# Patient Record
Sex: MALE | Race: WHITE | NOT HISPANIC OR LATINO | Employment: FULL TIME | URBAN - METROPOLITAN AREA
[De-identification: names, ages, dates, MRNs, and addresses within clinical notes are randomized per-mention and may not be internally consistent; named-entity substitution may affect disease eponyms.]

---

## 2023-07-31 ENCOUNTER — OFFICE VISIT (OUTPATIENT)
Dept: URGENT CARE | Facility: PHYSICIAN GROUP | Age: 53
End: 2023-07-31
Payer: COMMERCIAL

## 2023-07-31 VITALS
BODY MASS INDEX: 34.82 KG/M2 | SYSTOLIC BLOOD PRESSURE: 130 MMHG | DIASTOLIC BLOOD PRESSURE: 72 MMHG | TEMPERATURE: 98.7 F | WEIGHT: 280 LBS | RESPIRATION RATE: 18 BRPM | OXYGEN SATURATION: 100 % | HEIGHT: 75 IN | HEART RATE: 83 BPM

## 2023-07-31 DIAGNOSIS — L03.113 CELLULITIS OF RIGHT UPPER EXTREMITY: ICD-10-CM

## 2023-07-31 PROCEDURE — 3075F SYST BP GE 130 - 139MM HG: CPT | Performed by: FAMILY MEDICINE

## 2023-07-31 PROCEDURE — 99203 OFFICE O/P NEW LOW 30 MIN: CPT | Performed by: FAMILY MEDICINE

## 2023-07-31 PROCEDURE — 3078F DIAST BP <80 MM HG: CPT | Performed by: FAMILY MEDICINE

## 2023-07-31 RX ORDER — OMEPRAZOLE 20 MG/1
CAPSULE, DELAYED RELEASE ORAL
COMMUNITY
Start: 2023-07-28

## 2023-07-31 RX ORDER — AMOXICILLIN AND CLAVULANATE POTASSIUM 875; 125 MG/1; MG/1
1 TABLET, FILM COATED ORAL 2 TIMES DAILY
Qty: 14 TABLET | Refills: 0 | Status: SHIPPED | OUTPATIENT
Start: 2023-07-31 | End: 2023-08-07

## 2023-07-31 ASSESSMENT — ENCOUNTER SYMPTOMS
VOMITING: 0
EYE DISCHARGE: 0
EYE REDNESS: 0
WEIGHT LOSS: 0
MYALGIAS: 0
NAUSEA: 0

## 2023-07-31 NOTE — PROGRESS NOTES
"Subjective     Adriano Trevino is a 52 y.o. male who presents with Elbow Swelling (X 2 days R elbow swollen, no known injury )            2 days pain redness and warmth to skin of right elbow.  No obvious trauma.  No bites.  No drainage.  No vesicular rash.  No itching.  No fever.  No other aggravating alleviating factors.        Review of Systems   Constitutional:  Negative for malaise/fatigue and weight loss.   Eyes:  Negative for discharge and redness.   Gastrointestinal:  Negative for nausea and vomiting.   Musculoskeletal:  Negative for joint pain and myalgias.   Skin:  Negative for itching and rash.              Objective     /72   Pulse 83   Temp 37.1 °C (98.7 °F) (Temporal)   Resp 18   Ht 1.905 m (6' 3\")   Wt (!) 127 kg (280 lb)   SpO2 100%   BMI 35.00 kg/m²      Physical Exam  Constitutional:       General: He is not in acute distress.     Appearance: He is well-developed.   HENT:      Head: Normocephalic and atraumatic.   Eyes:      Conjunctiva/sclera: Conjunctivae normal.   Cardiovascular:      Rate and Rhythm: Normal rate and regular rhythm.      Heart sounds: Normal heart sounds. No murmur heard.  Pulmonary:      Effort: Pulmonary effort is normal.      Breath sounds: Normal breath sounds. No wheezing.   Skin:     General: Skin is warm and dry.      Findings: No rash.          Neurological:      Mental Status: He is alert.                             Assessment & Plan            1. Cellulitis of right upper extremity  amoxicillin-clavulanate (AUGMENTIN) 875-125 MG Tab        Differential diagnosis, natural history, supportive care, and indications for immediate follow-up were discussed.               "

## 2023-08-07 ENCOUNTER — OFFICE VISIT (OUTPATIENT)
Dept: URGENT CARE | Facility: PHYSICIAN GROUP | Age: 53
End: 2023-08-07
Payer: COMMERCIAL

## 2023-08-07 ENCOUNTER — HOSPITAL ENCOUNTER (OUTPATIENT)
Dept: RADIOLOGY | Facility: MEDICAL CENTER | Age: 53
End: 2023-08-07
Attending: NURSE PRACTITIONER
Payer: COMMERCIAL

## 2023-08-07 VITALS
HEART RATE: 84 BPM | TEMPERATURE: 99 F | OXYGEN SATURATION: 100 % | BODY MASS INDEX: 34.9 KG/M2 | SYSTOLIC BLOOD PRESSURE: 128 MMHG | WEIGHT: 286.6 LBS | RESPIRATION RATE: 16 BRPM | HEIGHT: 76 IN | DIASTOLIC BLOOD PRESSURE: 90 MMHG

## 2023-08-07 DIAGNOSIS — L03.113 CELLULITIS OF RIGHT UPPER EXTREMITY: ICD-10-CM

## 2023-08-07 DIAGNOSIS — M25.421 PAIN AND SWELLING OF ELBOW, RIGHT: ICD-10-CM

## 2023-08-07 DIAGNOSIS — M25.521 PAIN AND SWELLING OF ELBOW, RIGHT: ICD-10-CM

## 2023-08-07 PROBLEM — I87.2 VENOUS STASIS DERMATITIS OF LEFT LOWER EXTREMITY: Status: ACTIVE | Noted: 2019-11-08

## 2023-08-07 PROBLEM — R76.0 ANTI-CARDIOLIPIN ANTIBODY POSITIVE: Status: ACTIVE | Noted: 2019-11-08

## 2023-08-07 PROBLEM — I82.532 CHRONIC DEEP VEIN THROMBOSIS (DVT) OF POPLITEAL VEIN OF LEFT LOWER EXTREMITY (HCC): Status: ACTIVE | Noted: 2019-11-08

## 2023-08-07 PROCEDURE — 99214 OFFICE O/P EST MOD 30 MIN: CPT | Performed by: NURSE PRACTITIONER

## 2023-08-07 PROCEDURE — 3074F SYST BP LT 130 MM HG: CPT | Performed by: NURSE PRACTITIONER

## 2023-08-07 PROCEDURE — 73080 X-RAY EXAM OF ELBOW: CPT | Mod: RT

## 2023-08-07 PROCEDURE — 3080F DIAST BP >= 90 MM HG: CPT | Performed by: NURSE PRACTITIONER

## 2023-08-07 RX ORDER — WARFARIN SODIUM 5 MG/1
7.5 TABLET ORAL DAILY
COMMUNITY
Start: 2023-07-28

## 2023-08-07 RX ORDER — CLINDAMYCIN HYDROCHLORIDE 300 MG/1
300 CAPSULE ORAL 3 TIMES DAILY
Qty: 21 CAPSULE | Refills: 0 | Status: SHIPPED | OUTPATIENT
Start: 2023-08-07 | End: 2023-08-14

## 2023-08-07 ASSESSMENT — ENCOUNTER SYMPTOMS
NEUROLOGICAL NEGATIVE: 1
CONSTITUTIONAL NEGATIVE: 1
FEVER: 0

## 2023-08-07 ASSESSMENT — VISUAL ACUITY: OU: 1

## 2023-08-07 NOTE — PROGRESS NOTES
Subjective:     Adriano Trevino is a 52 y.o. male who presents for Elbow Problem (Right - was seen a week ago and was given antibiotics.)       Arm Pain   There was no injury mechanism.     Patient seen in  7/31/2023. Was having right elbow swelling, redness, warmth, and pain. Dx with cellulitis of the RUE and tx with Augmentin.    Patient returns with persistent symptoms. Still has localized pain, tenderness,   Warmth, and swelling over the elbow. Finished abx as directed. Has been icing and elevating. Denies fever or other symptoms.    Review of Systems   Constitutional: Negative.  Negative for fever.   Musculoskeletal:         Per HPI   Neurological: Negative.    All other systems reviewed and are negative.    Refer to HPI for additional details.    During this visit, appropriate PPE was worn, and hand hygiene was performed.    PMH:  has a past medical history of DVT (deep venous thrombosis) ([facility]) (1.13.2014) and Indigestion.    MEDS:   Current Outpatient Medications:     clindamycin (CLEOCIN) 300 MG Cap, Take 1 Capsule by mouth 3 times a day for 7 days., Disp: 21 Capsule, Rfl: 0    omeprazole (PRILOSEC) 20 MG delayed-release capsule, , Disp: , Rfl:     amoxicillin-clavulanate (AUGMENTIN) 875-125 MG Tab, Take 1 Tablet by mouth 2 times a day for 7 days., Disp: 14 Tablet, Rfl: 0    warfarin (COUMADIN) 7.5 MG TABS, Take 7.5 mg by mouth every day., Disp: , Rfl:     warfarin (COUMADIN) 5 MG Tab, Take 7.5 mg by mouth every day., Disp: , Rfl:     ALLERGIES: No Known Allergies  SURGHX:   Past Surgical History:   Procedure Laterality Date    OTHER      sinus surgery x 2; 2006, 2009    OTHER ABDOMINAL SURGERY      appendectomy ~ 1986     SOCHX:  reports that he has never smoked. His smokeless tobacco use includes chew. He reports current alcohol use. He reports that he does not use drugs.    FH: Per HPI as applicable/pertinent.      Objective:     BP (!) 128/90 (BP Location: Right arm, Patient Position: Sitting, BP  "Cuff Size: Adult long)   Pulse 84   Temp 37.2 °C (99 °F) (Temporal)   Resp 16   Ht 1.93 m (6' 4\")   Wt (!) 130 kg (286 lb 9.6 oz)   SpO2 100%   BMI 34.89 kg/m²     Physical Exam  Nursing note reviewed.   Constitutional:       General: He is not in acute distress.     Appearance: He is well-developed. He is not ill-appearing or toxic-appearing.   Eyes:      General: Vision grossly intact.   Cardiovascular:      Rate and Rhythm: Normal rate.      Pulses: Normal pulses.   Pulmonary:      Effort: Pulmonary effort is normal. No respiratory distress.   Musculoskeletal:         General: No deformity. Normal range of motion.      Right elbow: Swelling (Olecranon, induration, warmth, erythema; no definitive fluctuance) present. No deformity or lacerations. Normal range of motion. No tenderness.   Skin:     General: Skin is warm and dry.      Coloration: Skin is not pale.   Neurological:      Mental Status: He is alert and oriented to person, place, and time.      Motor: No weakness.   Psychiatric:         Behavior: Behavior normal. Behavior is cooperative.     X-ray of right elbow:    Details    Reading Physician Reading Date Result Priority   Benjamin Mott M.D.  [phone] 8/7/2023 Ur[facility]     Narrative & Impression     8/7/2023 10:00 AM     HISTORY/REASON FOR EXAM:  Atraumatic Pain/Swelling/Deformity     TECHNIQUE/EXAM DESCRIPTION AND NUMBER OF VIEWS:  3 views of the RIGHT elbow.     COMPARISON: None     FINDINGS:     Soft tissue swelling about the olecranon.     No acute fracture or dislocation. No elbow joint effusion.     No joint osteoarthritis.     IMPRESSION:     Soft tissue swelling about the olecranon.  No acute osseous abnormality.           Exam Ended: 08/07/23 10:06 AM Last Resulted: 08/07/23 11:02 AM           Radiology report and images reviewed by myself. Concur with findings.      Assessment/Plan:     1. Pain and swelling of elbow, right  - DX-ELBOW-COMPLETE 3+ RIGHT; Future    2. Cellulitis " of right upper extremity  - clindamycin (CLEOCIN) 300 MG Cap; Take 1 Capsule by mouth 3 times a day for 7 days.  Dispense: 21 Capsule; Refill: 0    Possible olecranon bursitis, but no definite fluctuance to indicate attempted aspiration. Patient is on warfarin. Rx as above sent electronically for clindamycin. Continue ice and elevation. Follow up with ortho; referral placed.    Differential diagnosis, natural history, supportive care, over-the-counter symptom management per 's instructions, close monitoring, and indications for immediate follow-up discussed.     All questions answered. Patient agrees with the plan of care.    Discharge summary provided via [employer].

## 2023-09-12 ENCOUNTER — OFFICE VISIT (OUTPATIENT)
Dept: URGENT CARE | Facility: PHYSICIAN GROUP | Age: 53
End: 2023-09-12
Payer: COMMERCIAL

## 2023-09-12 ENCOUNTER — HOSPITAL ENCOUNTER (OUTPATIENT)
Dept: RADIOLOGY | Facility: MEDICAL CENTER | Age: 53
End: 2023-09-12
Payer: COMMERCIAL

## 2023-09-12 VITALS
RESPIRATION RATE: 18 BRPM | OXYGEN SATURATION: 95 % | TEMPERATURE: 98.4 F | SYSTOLIC BLOOD PRESSURE: 128 MMHG | DIASTOLIC BLOOD PRESSURE: 86 MMHG | HEART RATE: 88 BPM | BODY MASS INDEX: 34.9 KG/M2 | WEIGHT: 286.6 LBS | HEIGHT: 76 IN

## 2023-09-12 DIAGNOSIS — J06.9 UPPER RESPIRATORY TRACT INFECTION, UNSPECIFIED TYPE: ICD-10-CM

## 2023-09-12 DIAGNOSIS — R10.32 LLQ ABDOMINAL PAIN: ICD-10-CM

## 2023-09-12 LAB
FLUAV RNA SPEC QL NAA+PROBE: NEGATIVE
FLUBV RNA SPEC QL NAA+PROBE: NEGATIVE
RSV RNA SPEC QL NAA+PROBE: NEGATIVE
SARS-COV-2 RNA RESP QL NAA+PROBE: NEGATIVE

## 2023-09-12 PROCEDURE — 99213 OFFICE O/P EST LOW 20 MIN: CPT

## 2023-09-12 PROCEDURE — 3079F DIAST BP 80-89 MM HG: CPT

## 2023-09-12 PROCEDURE — 0241U POCT CEPHEID COV-2, FLU A/B, RSV - PCR: CPT

## 2023-09-12 PROCEDURE — 700117 HCHG RX CONTRAST REV CODE 255

## 2023-09-12 PROCEDURE — 3074F SYST BP LT 130 MM HG: CPT

## 2023-09-12 PROCEDURE — 74177 CT ABD & PELVIS W/CONTRAST: CPT

## 2023-09-12 RX ADMIN — IOHEXOL 100 ML: 350 INJECTION, SOLUTION INTRAVENOUS at 10:40

## 2023-09-12 NOTE — LETTER
September 12, 2023    To Whom It May Concern:         This is confirmation that Adriano M[place] attended his scheduled appointment with YANELIS Lomas on 9/12/23.    He may return to work on 09/14/23.         If you have any questions please do not hesitate to call me at the phone number listed below.    Sincerely,          GABRIEL Lomas.  [phone]

## 2023-09-12 NOTE — PROGRESS NOTES
Subjective:   Adriano Trevino is a 52 y.o. male who presents for GI Problem (LT stomach pain x4 days. ) and Nasal Congestion (Coughing, sneezing, and nasal congestion x4-5 days. )      HPI:    Patient presents urgent care with concerns of left lower quadrant abdominal pain, rhinorrhea, and nasal congestion, cough.  Onset was 4 to 5 days ago  Patient states lower quadrant abdominal pain is primarily present when he coughs.  Reports mild shortness of breath with activity, however he attributes that to nasal congestion.  Denies chest pain, dizziness, palpitations, leg swelling/pain, orthopnea.  Cough is nonproductive and dry  Denies wheezing, retractions.  Denies history of asthma, copd, smoking  He has a chronic DVT in the left lower extremity, which patient states involves most of his left leg. He is on warfarin and has not missed a dose. DVT is managed by his PCP in [place].   Has not taken many medications for cold symptoms due to use of Warfarin  Denies constipation, diarrhea, nausea, emesis, anorexia  Denies LUTs        ROS As above in HPI    Medications:    Current Outpatient Medications on File Prior to Visit   Medication Sig Dispense Refill   • omeprazole (PRILOSEC) 20 MG delayed-release capsule      • warfarin (COUMADIN) 7.5 MG TABS Take 7.5 mg by mouth every day.     • warfarin (COUMADIN) 5 MG Tab Take 7.5 mg by mouth every day. (Patient not taking: Reported on 9/12/2023)       No current facility-administered medications on file prior to visit.        Allergies:   Patient has no known allergies.    Problem List:   Patient Active Problem List   Diagnosis   • Venous stasis dermatitis of left lower extremity   • Chronic deep vein thrombosis (DVT) of popliteal vein of left lower extremity (HCC)   • Anti-cardiolipin antibody positive        Surgical History:  Past Surgical History:   Procedure Laterality Date   • OTHER      sinus surgery x 2; 2006, 2009   • OTHER ABDOMINAL SURGERY      appendectomy ~ 1986  "      Past Social Hx:   Social History     Tobacco Use   • Smoking status: Never   • Smokeless tobacco: Current     Types: Chew   Vaping Use   • Vaping Use: Never used   Substance Use Topics   • Alcohol use: Yes     Comment: occ   • Drug use: No          Problem list, medications, and allergies reviewed by myself today in Epic.     Objective:     /86 (BP Location: Right arm, Patient Position: Sitting, BP Cuff Size: Large adult)   Pulse 88   Temp 36.9 °C (98.4 °F) (Temporal)   Resp 18   Ht 1.93 m (6' 4\")   Wt (!) 130 kg (286 lb 9.6 oz)   SpO2 95%   BMI 34.89 kg/m²     Physical Exam  Vitals and nursing note reviewed.   Constitutional:       General: He is not in acute distress.     Appearance: Normal appearance. He is not ill-appearing or diaphoretic.   HENT:      Head: Normocephalic.      Right Ear: Tympanic membrane and ear canal normal.      Left Ear: Tympanic membrane and ear canal normal.      Nose: Congestion and rhinorrhea present.      Mouth/Throat:      Mouth: Mucous membranes are moist.      Pharynx: Oropharynx is clear. Posterior oropharyngeal erythema present.   Cardiovascular:      Rate and Rhythm: Normal rate and regular rhythm.      Heart sounds: Normal heart sounds. No murmur heard.     No friction rub. No gallop.   Pulmonary:      Effort: Pulmonary effort is normal. No respiratory distress.      Breath sounds: Normal breath sounds. No stridor. No wheezing, rhonchi or rales.   Chest:      Chest wall: No tenderness.   Abdominal:      General: Abdomen is flat. Bowel sounds are increased. There is no distension.      Palpations: Abdomen is soft. There is no hepatomegaly, splenomegaly, mass or pulsatile mass.      Tenderness: There is abdominal tenderness in the left lower quadrant. There is no right CVA tenderness, left CVA tenderness, guarding or rebound.      Hernia: No hernia is present.   Skin:     General: Skin is warm and dry.      Capillary Refill: Capillary refill takes less than 2 " seconds.      Findings: No rash.   Neurological:      Mental Status: He is alert and oriented to person, place, and time.       Assessment/Plan:     CT-ABDOMEN-PELVIS WITH 09/12/2023    Narrative  9/12/2023 10:24 AM    HISTORY/REASON FOR EXAM:  Left lower quadrant abdominal pain..      TECHNIQUE/EXAM DESCRIPTION:   CT scan of the abdomen and pelvis with contrast.    Contrast-enhanced helical scanning was obtained from the diaphragmatic domes through the pubic symphysis following the bolus administration of nonionic contrast without complication.    100 mL of Omnipaque 350 nonionic contrast was administered without complication.    Low dose optimization technique was utilized for this CT exam including automated exposure control and adjustment of the mA and/or kV according to patient size.    COMPARISON: No prior studies available.    FINDINGS:  Lower Chest: Unremarkable.    Liver: Fatty change. There is a subcentimeter low-attenuation focus within the right lobe of the liver consistent with cyst or hemangioma.    Spleen: Unremarkable.    Pancreas: Unremarkable.    Gallbladder: No calcified stones.    Biliary: Nondilated.    Adrenal glands: Normal.    Kidneys: There is a left lower pole simple appearing renal cyst. No follow-up recommended..    Bowel: There is colonic diverticulosis. No evidence of diverticulitis..    Lymph nodes: No adenopathy.    Vasculature: There is atherosclerotic vascular calcification..    Musculoskeletal: No acute or destructive process.    Pelvis: No adenopathy or free fluid.    Impression  1.  No evidence of bowel obstruction or focal inflammatory change.    2.  Scattered colonic diverticulosis.    3.  Fatty liver.      Results for orders placed or performed in visit on 09/12/23   POCT CEPHEID COV-2, FLU A/B, RSV - PCR   Result Value Ref Range    SARS-CoV-2 by PCR Negative Negative, Invalid    Influenza virus A RNA Negative Negative, Invalid    Influenza virus B, PCR Negative Negative,  Invalid    RSV, PCR Negative Negative, Invalid       Diagnosis and associated orders:   1. LLQ abdominal pain  - CT-ABDOMEN-PELVIS WITH; Future    2. Upper respiratory tract infection, unspecified type  - POCT CEPHEID COV-2, FLU A/B, RSV - PCR        Comments/MDM:     Patient presents urgent care with concerns of left lower quadrant abdominal pain.  CT of abdomen obtained to evaluate for diverticular disease.  Per radiology impression,1.  No evidence of bowel obstruction or focal inflammatory change. 2/  Scattered colonic diverticulosis. 3.  Fatty liver.    Tolerating oral intake, no signs of peritonitis.  Afebrile.  Acute viral illness is likely exacerbating left lower muscular strain in addition to  diverticular component.     Supportive care is reviewed with patient: Recommend to push PO fluids and electrolytes,  probiotics, observe for resolution  Return to clinic with lack of resolution or progression of symptoms.     Sent w/ handout on advancing diet, we discussed increasing fiber in the future potentially avoid diverticular flare-make follow-up appointment with primary care in the next 2 to 3 weeks, return to clinic with any worsening  ER precautions with any worsening symptoms are reviewed with patient/caregiver and they do express understanding  Pt declines GI referral     Differential diagnosis, natural history, supportive care, and indications for immediate follow-up discussed.        Please note that this dictation was created using voice recognition software. I have made a reasonable attempt to correct obvious errors, but I expect that there are errors of grammar and possibly content that I did not discover before finalizing the note.    This note was electronically signed by MIREYA Marks

## 2024-09-24 PROBLEM — M17.12 DEGENERATIVE ARTHRITIS OF LEFT KNEE: Status: ACTIVE | Noted: 2024-09-24

## 2024-10-16 ENCOUNTER — OFFICE VISIT (OUTPATIENT)
Dept: CARDIOLOGY | Facility: MEDICAL CENTER | Age: 54
End: 2024-10-16
Attending: INTERNAL MEDICINE
Payer: COMMERCIAL

## 2024-10-16 VITALS
OXYGEN SATURATION: 97 % | WEIGHT: 290 LBS | HEIGHT: 76 IN | RESPIRATION RATE: 20 BRPM | BODY MASS INDEX: 35.31 KG/M2 | DIASTOLIC BLOOD PRESSURE: 74 MMHG | HEART RATE: 78 BPM | SYSTOLIC BLOOD PRESSURE: 122 MMHG

## 2024-10-16 DIAGNOSIS — I82.532 CHRONIC DEEP VEIN THROMBOSIS (DVT) OF POPLITEAL VEIN OF LEFT LOWER EXTREMITY (HCC): ICD-10-CM

## 2024-10-16 DIAGNOSIS — D68.9 COAGULATION DEFECT, UNSPECIFIED (HCC): ICD-10-CM

## 2024-10-16 DIAGNOSIS — R76.0 ANTI-CARDIOLIPIN ANTIBODY POSITIVE: ICD-10-CM

## 2024-10-16 LAB — EKG IMPRESSION: NORMAL

## 2024-10-16 PROCEDURE — 3074F SYST BP LT 130 MM HG: CPT | Performed by: INTERNAL MEDICINE

## 2024-10-16 PROCEDURE — 93005 ELECTROCARDIOGRAM TRACING: CPT | Performed by: INTERNAL MEDICINE

## 2024-10-16 PROCEDURE — 93010 ELECTROCARDIOGRAM REPORT: CPT | Performed by: INTERNAL MEDICINE

## 2024-10-16 PROCEDURE — 99204 OFFICE O/P NEW MOD 45 MIN: CPT | Performed by: INTERNAL MEDICINE

## 2024-10-16 PROCEDURE — 99211 OFF/OP EST MAY X REQ PHY/QHP: CPT | Performed by: INTERNAL MEDICINE

## 2024-10-16 PROCEDURE — 3078F DIAST BP <80 MM HG: CPT | Performed by: INTERNAL MEDICINE

## 2024-10-16 ASSESSMENT — ENCOUNTER SYMPTOMS
CONSTIPATION: 0
PALPITATIONS: 0
SYNCOPE: 0
SHORTNESS OF BREATH: 0
HEARTBURN: 0
DYSPNEA ON EXERTION: 0
DIARRHEA: 0
ABDOMINAL PAIN: 0
NAUSEA: 0
WEIGHT LOSS: 0
WEIGHT GAIN: 0
VOMITING: 0
CLAUDICATION: 0
BACK PAIN: 0
ALTERED MENTAL STATUS: 0
ORTHOPNEA: 0
IRREGULAR HEARTBEAT: 0
FLANK PAIN: 0
COUGH: 0
PND: 0
BLURRED VISION: 0
DIZZINESS: 0
FEVER: 0
DECREASED APPETITE: 0
NEAR-SYNCOPE: 0
DEPRESSION: 0

## 2024-10-23 ENCOUNTER — HOSPITAL ENCOUNTER (OUTPATIENT)
Dept: RADIOLOGY | Facility: MEDICAL CENTER | Age: 54
End: 2024-10-23
Attending: ORTHOPAEDIC SURGERY
Payer: COMMERCIAL

## 2024-10-23 DIAGNOSIS — Z01.818 PRE-OP EXAM: ICD-10-CM

## 2024-10-23 PROCEDURE — 73700 CT LOWER EXTREMITY W/O DYE: CPT | Mod: LT

## 2025-08-29 ENCOUNTER — HOSPITAL ENCOUNTER (EMERGENCY)
Facility: MEDICAL CENTER | Age: 55
End: 2025-08-30
Attending: EMERGENCY MEDICINE
Payer: COMMERCIAL

## 2025-08-29 ENCOUNTER — APPOINTMENT (OUTPATIENT)
Dept: RADIOLOGY | Facility: MEDICAL CENTER | Age: 55
End: 2025-08-29
Attending: EMERGENCY MEDICINE
Payer: COMMERCIAL

## 2025-08-29 VITALS
RESPIRATION RATE: 16 BRPM | DIASTOLIC BLOOD PRESSURE: 80 MMHG | BODY MASS INDEX: 35.84 KG/M2 | HEART RATE: 83 BPM | OXYGEN SATURATION: 93 % | HEIGHT: 77 IN | TEMPERATURE: 98.2 F | SYSTOLIC BLOOD PRESSURE: 126 MMHG | WEIGHT: 303.57 LBS

## 2025-08-29 DIAGNOSIS — S01.81XA FACIAL LACERATION, INITIAL ENCOUNTER: ICD-10-CM
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
## 2025-08-29 DIAGNOSIS — Z79.01 ANTICOAGULATED: ICD-10-CM

## 2025-08-29 PROCEDURE — 304999 HCHG REPAIR-SIMPLE/INTERMED LEVEL 1

## 2025-08-29 PROCEDURE — 303747 HCHG EXTRA SUTURE

## 2025-08-29 PROCEDURE — 304217 HCHG IRRIGATION SYSTEM

## 2025-08-29 PROCEDURE — 90471 IMMUNIZATION ADMIN: CPT

## 2025-08-29 PROCEDURE — 99284 EMERGENCY DEPT VISIT MOD MDM: CPT

## 2025-08-29 PROCEDURE — 700111 HCHG RX REV CODE 636 W/ 250 OVERRIDE (IP): Performed by: EMERGENCY MEDICINE

## 2025-08-29 PROCEDURE — 700101 HCHG RX REV CODE 250: Performed by: EMERGENCY MEDICINE

## 2025-08-29 PROCEDURE — 90715 TDAP VACCINE 7 YRS/> IM: CPT | Performed by: EMERGENCY MEDICINE

## 2025-08-29 RX ORDER — LIDOCAINE HYDROCHLORIDE AND EPINEPHRINE 10; 10 MG/ML; UG/ML
20 INJECTION, SOLUTION INFILTRATION; PERINEURAL ONCE
Status: COMPLETED | OUTPATIENT
Start: 2025-08-29 | End: 2025-08-29

## 2025-08-29 RX ADMIN — LIDOCAINE HYDROCHLORIDE AND EPINEPHRINE 20 ML: 10; 10 INJECTION, SOLUTION INFILTRATION; PERINEURAL at 23:25

## 2025-08-29 RX ADMIN — CLOSTRIDIUM TETANI TOXOID ANTIGEN (FORMALDEHYDE INACTIVATED), CORYNEBACTERIUM DIPHTHERIAE TOXOID ANTIGEN (FORMALDEHYDE INACTIVATED), BORDETELLA PERTUSSIS TOXOID ANTIGEN (GLUTARALDEHYDE INACTIVATED), BORDETELLA PERTUSSIS FILAMENTOUS HEMAGGLUTININ ANTIGEN (FORMALDEHYDE INACTIVATED), BORDETELLA PERTUSSIS PERTACTIN ANTIGEN, AND BORDETELLA PERTUSSIS FIMBRIAE 2/3 ANTIGEN 0.5 ML: 5; 2; 2.5; 5; 3; 5 INJECTION, SUSPENSION INTRAMUSCULAR at 23:39

## 2025-08-30 PROCEDURE — 700102 HCHG RX REV CODE 250 W/ 637 OVERRIDE(OP): Performed by: EMERGENCY MEDICINE

## 2025-08-30 PROCEDURE — A9270 NON-COVERED ITEM OR SERVICE: HCPCS | Performed by: EMERGENCY MEDICINE

## 2025-08-30 PROCEDURE — 70450 CT HEAD/BRAIN W/O DYE: CPT

## 2025-08-30 RX ORDER — HYDROCODONE BITARTRATE AND ACETAMINOPHEN 5; 325 MG/1; MG/1
1 TABLET ORAL ONCE
Refills: 0 | Status: COMPLETED | OUTPATIENT
Start: 2025-08-30 | End: 2025-08-30

## 2025-08-30 RX ADMIN — HYDROCODONE BITARTRATE AND ACETAMINOPHEN 1 TABLET: 5; 325 TABLET ORAL at 01:00
